# Patient Record
Sex: MALE | Race: BLACK OR AFRICAN AMERICAN | Employment: FULL TIME | URBAN - METROPOLITAN AREA
[De-identification: names, ages, dates, MRNs, and addresses within clinical notes are randomized per-mention and may not be internally consistent; named-entity substitution may affect disease eponyms.]

---

## 2023-10-27 ENCOUNTER — ANCILLARY PROCEDURE (OUTPATIENT)
Dept: GENERAL RADIOLOGY | Facility: CLINIC | Age: 28
End: 2023-10-27
Attending: INTERNAL MEDICINE
Payer: COMMERCIAL

## 2023-10-27 ENCOUNTER — TELEPHONE (OUTPATIENT)
Dept: FAMILY MEDICINE | Facility: CLINIC | Age: 28
End: 2023-10-27

## 2023-10-27 ENCOUNTER — OFFICE VISIT (OUTPATIENT)
Dept: FAMILY MEDICINE | Facility: CLINIC | Age: 28
End: 2023-10-27
Payer: COMMERCIAL

## 2023-10-27 VITALS
HEART RATE: 87 BPM | RESPIRATION RATE: 16 BRPM | DIASTOLIC BLOOD PRESSURE: 71 MMHG | WEIGHT: 191.8 LBS | BODY MASS INDEX: 24.62 KG/M2 | TEMPERATURE: 98 F | SYSTOLIC BLOOD PRESSURE: 113 MMHG | HEIGHT: 74 IN

## 2023-10-27 DIAGNOSIS — R05.2 SUBACUTE COUGH: ICD-10-CM

## 2023-10-27 DIAGNOSIS — R09.81 NASAL CONGESTION: ICD-10-CM

## 2023-10-27 DIAGNOSIS — Z13.1 SCREENING FOR DIABETES MELLITUS: ICD-10-CM

## 2023-10-27 DIAGNOSIS — R06.2 WHEEZE: ICD-10-CM

## 2023-10-27 DIAGNOSIS — Z11.4 SCREENING FOR HIV (HUMAN IMMUNODEFICIENCY VIRUS): Primary | ICD-10-CM

## 2023-10-27 DIAGNOSIS — Z13.220 LIPID SCREENING: ICD-10-CM

## 2023-10-27 DIAGNOSIS — Z11.3 SCREEN FOR STD (SEXUALLY TRANSMITTED DISEASE): ICD-10-CM

## 2023-10-27 DIAGNOSIS — R53.83 OTHER FATIGUE: ICD-10-CM

## 2023-10-27 DIAGNOSIS — Z11.59 NEED FOR HEPATITIS C SCREENING TEST: ICD-10-CM

## 2023-10-27 LAB
ALBUMIN SERPL BCG-MCNC: 4.7 G/DL (ref 3.5–5.2)
ALP SERPL-CCNC: 72 U/L (ref 40–129)
ALT SERPL W P-5'-P-CCNC: 19 U/L (ref 0–70)
ANION GAP SERPL CALCULATED.3IONS-SCNC: 13 MMOL/L (ref 7–15)
AST SERPL W P-5'-P-CCNC: 30 U/L (ref 0–45)
BILIRUB SERPL-MCNC: 0.4 MG/DL
BUN SERPL-MCNC: 12.1 MG/DL (ref 6–20)
C TRACH DNA SPEC QL NAA+PROBE: NEGATIVE
CALCIUM SERPL-MCNC: 9.5 MG/DL (ref 8.6–10)
CHLORIDE SERPL-SCNC: 104 MMOL/L (ref 98–107)
CHOLEST SERPL-MCNC: 194 MG/DL
CREAT SERPL-MCNC: 0.83 MG/DL (ref 0.67–1.17)
DEPRECATED HCO3 PLAS-SCNC: 23 MMOL/L (ref 22–29)
EGFRCR SERPLBLD CKD-EPI 2021: >90 ML/MIN/1.73M2
ERYTHROCYTE [DISTWIDTH] IN BLOOD BY AUTOMATED COUNT: 12.5 % (ref 10–15)
GLUCOSE SERPL-MCNC: 89 MG/DL (ref 70–99)
HBA1C MFR BLD: 5.2 % (ref 0–5.6)
HCT VFR BLD AUTO: 45.7 % (ref 40–53)
HCV AB SERPL QL IA: NONREACTIVE
HDLC SERPL-MCNC: 50 MG/DL
HGB BLD-MCNC: 15 G/DL (ref 13.3–17.7)
HIV 1+2 AB+HIV1 P24 AG SERPL QL IA: NONREACTIVE
HSV1 IGG SERPL QL IA: 34.5 INDEX
HSV1 IGG SERPL QL IA: ABNORMAL
HSV2 IGG SERPL QL IA: 0.28 INDEX
HSV2 IGG SERPL QL IA: ABNORMAL
LDLC SERPL CALC-MCNC: 130 MG/DL
MCH RBC QN AUTO: 29.6 PG (ref 26.5–33)
MCHC RBC AUTO-ENTMCNC: 32.8 G/DL (ref 31.5–36.5)
MCV RBC AUTO: 90 FL (ref 78–100)
N GONORRHOEA DNA SPEC QL NAA+PROBE: NEGATIVE
NONHDLC SERPL-MCNC: 144 MG/DL
PLATELET # BLD AUTO: 227 10E3/UL (ref 150–450)
POTASSIUM SERPL-SCNC: 4 MMOL/L (ref 3.4–5.3)
PROT SERPL-MCNC: 7.5 G/DL (ref 6.4–8.3)
RBC # BLD AUTO: 5.07 10E6/UL (ref 4.4–5.9)
SODIUM SERPL-SCNC: 140 MMOL/L (ref 135–145)
T PALLIDUM AB SER QL: NONREACTIVE
TRIGL SERPL-MCNC: 71 MG/DL
TSH SERPL DL<=0.005 MIU/L-ACNC: 2.04 UIU/ML (ref 0.3–4.2)
WBC # BLD AUTO: 5.7 10E3/UL (ref 4–11)

## 2023-10-27 PROCEDURE — 99204 OFFICE O/P NEW MOD 45 MIN: CPT | Performed by: INTERNAL MEDICINE

## 2023-10-27 PROCEDURE — 71046 X-RAY EXAM CHEST 2 VIEWS: CPT | Mod: TC | Performed by: INTERNAL MEDICINE

## 2023-10-27 PROCEDURE — 87591 N.GONORRHOEAE DNA AMP PROB: CPT | Performed by: INTERNAL MEDICINE

## 2023-10-27 PROCEDURE — 83036 HEMOGLOBIN GLYCOSYLATED A1C: CPT | Performed by: INTERNAL MEDICINE

## 2023-10-27 PROCEDURE — 84443 ASSAY THYROID STIM HORMONE: CPT | Performed by: INTERNAL MEDICINE

## 2023-10-27 PROCEDURE — 80053 COMPREHEN METABOLIC PANEL: CPT | Performed by: INTERNAL MEDICINE

## 2023-10-27 PROCEDURE — 80061 LIPID PANEL: CPT | Performed by: INTERNAL MEDICINE

## 2023-10-27 PROCEDURE — 86803 HEPATITIS C AB TEST: CPT | Performed by: INTERNAL MEDICINE

## 2023-10-27 PROCEDURE — 36415 COLL VENOUS BLD VENIPUNCTURE: CPT | Performed by: INTERNAL MEDICINE

## 2023-10-27 PROCEDURE — 87389 HIV-1 AG W/HIV-1&-2 AB AG IA: CPT | Performed by: INTERNAL MEDICINE

## 2023-10-27 PROCEDURE — 86481 TB AG RESPONSE T-CELL SUSP: CPT | Performed by: INTERNAL MEDICINE

## 2023-10-27 PROCEDURE — 84403 ASSAY OF TOTAL TESTOSTERONE: CPT | Performed by: INTERNAL MEDICINE

## 2023-10-27 PROCEDURE — 86696 HERPES SIMPLEX TYPE 2 TEST: CPT | Performed by: INTERNAL MEDICINE

## 2023-10-27 PROCEDURE — 86695 HERPES SIMPLEX TYPE 1 TEST: CPT | Performed by: INTERNAL MEDICINE

## 2023-10-27 PROCEDURE — 86780 TREPONEMA PALLIDUM: CPT | Performed by: INTERNAL MEDICINE

## 2023-10-27 PROCEDURE — 87491 CHLMYD TRACH DNA AMP PROBE: CPT | Performed by: INTERNAL MEDICINE

## 2023-10-27 PROCEDURE — 85027 COMPLETE CBC AUTOMATED: CPT | Performed by: INTERNAL MEDICINE

## 2023-10-27 RX ORDER — CETIRIZINE HYDROCHLORIDE 10 MG/1
10 TABLET ORAL DAILY
Qty: 30 TABLET | Refills: 1 | Status: SHIPPED | OUTPATIENT
Start: 2023-10-27

## 2023-10-27 RX ORDER — FLUTICASONE PROPIONATE 50 MCG
1 SPRAY, SUSPENSION (ML) NASAL DAILY
Qty: 18.2 ML | Refills: 1 | Status: SHIPPED | OUTPATIENT
Start: 2023-10-27

## 2023-10-27 RX ORDER — BUDESONIDE AND FORMOTEROL FUMARATE DIHYDRATE 80; 4.5 UG/1; UG/1
2 AEROSOL RESPIRATORY (INHALATION) 2 TIMES DAILY
Qty: 10.2 G | Refills: 0 | Status: SHIPPED | OUTPATIENT
Start: 2023-10-27

## 2023-10-27 ASSESSMENT — ANXIETY QUESTIONNAIRES
2. NOT BEING ABLE TO STOP OR CONTROL WORRYING: SEVERAL DAYS
7. FEELING AFRAID AS IF SOMETHING AWFUL MIGHT HAPPEN: NOT AT ALL
3. WORRYING TOO MUCH ABOUT DIFFERENT THINGS: SEVERAL DAYS
GAD7 TOTAL SCORE: 8
IF YOU CHECKED OFF ANY PROBLEMS ON THIS QUESTIONNAIRE, HOW DIFFICULT HAVE THESE PROBLEMS MADE IT FOR YOU TO DO YOUR WORK, TAKE CARE OF THINGS AT HOME, OR GET ALONG WITH OTHER PEOPLE: SOMEWHAT DIFFICULT
GAD7 TOTAL SCORE: 8
5. BEING SO RESTLESS THAT IT IS HARD TO SIT STILL: SEVERAL DAYS
1. FEELING NERVOUS, ANXIOUS, OR ON EDGE: MORE THAN HALF THE DAYS
6. BECOMING EASILY ANNOYED OR IRRITABLE: SEVERAL DAYS

## 2023-10-27 ASSESSMENT — PATIENT HEALTH QUESTIONNAIRE - PHQ9
5. POOR APPETITE OR OVEREATING: MORE THAN HALF THE DAYS
SUM OF ALL RESPONSES TO PHQ QUESTIONS 1-9: 9

## 2023-10-27 ASSESSMENT — PAIN SCALES - GENERAL: PAINLEVEL: NO PAIN (0)

## 2023-10-27 NOTE — PROGRESS NOTES
Assessment & Plan   Problem List Items Addressed This Visit    None  Visit Diagnoses       Screening for HIV (human immunodeficiency virus)    -  Primary    Relevant Orders    HIV Antigen Antibody Combo    Need for hepatitis C screening test        Relevant Orders    Hepatitis C Screen Reflex to HCV RNA Quant and Genotype    Other fatigue        Relevant Orders    Comprehensive metabolic panel (BMP + Alb, Alk Phos, ALT, AST, Total. Bili, TP)    Testosterone, total    TSH with free T4 reflex    CBC with platelets    Screen for STD (sexually transmitted disease)        Relevant Orders    NEISSERIA GONORRHOEA PCR    CHLAMYDIA TRACHOMATIS PCR    Treponema Abs w Reflex to RPR and Titer    Herpes Simplex Virus 1 and 2 IgG    Lipid screening        Relevant Orders    Lipid panel reflex to direct LDL Fasting    Screening for diabetes mellitus        Relevant Orders    Hemoglobin A1c    Wheeze        Relevant Medications    budesonide-formoterol (SYMBICORT) 80-4.5 MCG/ACT Inhaler    fluticasone (FLONASE) 50 MCG/ACT nasal spray    cetirizine (ZYRTEC) 10 MG tablet    Other Relevant Orders    General PFT Lab (Please always keep checked)    XR Chest 2 Views    Nasal congestion        Relevant Medications    fluticasone (FLONASE) 50 MCG/ACT nasal spray    cetirizine (ZYRTEC) 10 MG tablet    Subacute cough        Relevant Medications    budesonide-formoterol (SYMBICORT) 80-4.5 MCG/ACT Inhaler    fluticasone (FLONASE) 50 MCG/ACT nasal spray    cetirizine (ZYRTEC) 10 MG tablet    Other Relevant Orders    XR Chest 2 Views    Quantiferon TB Gold Plus        Advised to start on inhaled steroids was formoterol just twice a day and as needed he does have some wheeze on exam it seems the symptoms have been lingering for the last couple weeks, first time seeing patient he does reports history of some ongoing URI symptoms also had some coughing blood recently denies having history of asthma or tuberculosis, he is born in [facility]  .  He  "denies currently chest pain pleuritic chest pain or difficulty breathing.  Pulse ox is 100% on room air he is not in any distress.  Given his history of hemoptysis and lingering symptoms we will go ahead and check chest x-ray QuantiFERON-TB test and basic labs.  He wants to get testosterone checked he feels fatigued and tired we will check some other basic labs as well advised him to schedule wellness visit in next 4 weeks.  He did have a pimple on the penis shaft that has resolved, he will be getting STD testing.  He is sexually active with 1 partner.  Denies any other symptoms of STDs that he is aware of denies any dysuria or urethral discharge.  No other systemic complaints.               Andre Soto MD  [facility] RACHEL Toledo is a 28 year old, presenting for the following health issues:  Establish Care        10/27/2023     7:40 AM   Additional Questions   Roomed by Monaly   Accompanied by Not applicable, by themselves       HPI     Patient presenting establishing care and discuss chronic medical or active medical conditions.  He has ongoing cough for the last couple weeks intermittent wheeze nasal congestion.  He reports tested negative for COVID.  Currently is not coughing or in any distress does not have fevers.  He is sounds congested.  Also is dealing with some fatigue issues has not been to a doctor for a while.  There is strong family history of diabetes.  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    Review of Systems   Constitutional, HEENT, cardiovascular, pulmonary, gi and gu systems are negative, except as otherwise noted.      Objective    /71 (BP Location: Right arm, Patient Position: Sitting, Cuff Size: Adult Regular)   Pulse 87   Temp 98  F (36.7  C) (Oral)   Resp 16   Ht 1.88 m (6' 2\")   Wt 87 kg (191 lb 12.8 oz)   BMI 24.63 kg/m    Body mass index is 24.63 kg/m .  Physical Exam "   GENERAL: healthy, alert and no distress  EYES: Eyes grossly normal to inspection, PERRL and conjunctivae and sclerae normal  HENT: ear canals and TM's normal, nose and mouth without ulcers or lesions, nostrils boggy turbinates with clear discharge  NECK: no adenopathy, no asymmetry, masses, or scars and thyroid normal to palpation  RESP: Wheeze mild intermittent bilateral good aeration bilaterally good chest rise bilateral  CV: regular rate and rhythm, normal S1 S2, no S3 or S4, no murmur, click or rub, no peripheral edema and peripheral pulses strong  ABDOMEN: soft, nontender, no hepatosplenomegaly, no masses and bowel sounds normal  MS: no gross musculoskeletal defects noted, no edema  SKIN: no suspicious lesions or rashes  NEURO: Normal strength and tone, mentation intact and speech normal  PSYCH: mentation appears normal, affect normal/bright    No results found for any previous visit.

## 2023-10-27 NOTE — TELEPHONE ENCOUNTER
Patient Contact    Attempt # 2    Was call answered?  No.  Left message on voicemail with information to call clinic back.    Dayanara Garza RN  [facility]

## 2023-10-27 NOTE — TELEPHONE ENCOUNTER
Andre Soto MD  [facility] T[facility]  Please reassure chest x-ray is normal does not show pneumonia.  Dr Soto        Patient Contact    Attempt # 1    Was call answered? No.    Left message for patient to call triage back.    Stefanie Velasco RN

## 2023-10-27 NOTE — TELEPHONE ENCOUNTER
Pt returned call and message from Dr. Soto was relayed, see below. Pt stated understanding and does not have any other questions at this time. He confirms he has access to [facility]. He is aware some lab results are still in process.    Indira PATEL RN  [facility]

## 2023-10-29 LAB
GAMMA INTERFERON BACKGROUND BLD IA-ACNC: 0 IU/ML
M TB IFN-G BLD-IMP: NEGATIVE
M TB IFN-G CD4+ BCKGRND COR BLD-ACNC: 10 IU/ML
MITOGEN IGNF BCKGRD COR BLD-ACNC: 0 IU/ML
MITOGEN IGNF BCKGRD COR BLD-ACNC: 0 IU/ML
QUANTIFERON MITOGEN: 10 IU/ML
QUANTIFERON NIL TUBE: 0 IU/ML
QUANTIFERON TB1 TUBE: 0 IU/ML
QUANTIFERON TB2 TUBE: 0
TESTOST SERPL-MCNC: 416 NG/DL (ref 240–950)

## 2023-11-25 ENCOUNTER — HEALTH MAINTENANCE LETTER (OUTPATIENT)
Age: 28
End: 2023-11-25

## 2025-01-04 ENCOUNTER — HEALTH MAINTENANCE LETTER (OUTPATIENT)
Age: 30
End: 2025-01-04